# Patient Record
Sex: FEMALE | Race: WHITE | Employment: OTHER | URBAN - METROPOLITAN AREA
[De-identification: names, ages, dates, MRNs, and addresses within clinical notes are randomized per-mention and may not be internally consistent; named-entity substitution may affect disease eponyms.]

---

## 2019-01-08 ENCOUNTER — HOSPITAL ENCOUNTER (OUTPATIENT)
Dept: MAMMOGRAPHY | Age: 74
Discharge: HOME | End: 2019-01-08
Attending: INTERNAL MEDICINE
Payer: MEDICARE

## 2019-01-08 DIAGNOSIS — Z12.31 VISIT FOR SCREENING MAMMOGRAM: ICD-10-CM

## 2019-01-08 PROCEDURE — 77063 BREAST TOMOSYNTHESIS BI: CPT

## 2022-06-06 ENCOUNTER — HOSPITAL ENCOUNTER (EMERGENCY)
Dept: GENERAL RADIOLOGY | Age: 77
Discharge: HOME | End: 2022-06-09
Payer: COMMERCIAL

## 2022-06-06 ENCOUNTER — HOSPITAL ENCOUNTER (EMERGENCY)
Age: 77
Discharge: HOME | End: 2022-06-06
Attending: EMERGENCY MEDICINE
Payer: COMMERCIAL

## 2022-06-06 VITALS
OXYGEN SATURATION: 99 % | BODY MASS INDEX: 25.95 KG/M2 | HEART RATE: 100 BPM | SYSTOLIC BLOOD PRESSURE: 154 MMHG | RESPIRATION RATE: 18 BRPM | HEIGHT: 64 IN | WEIGHT: 152 LBS | TEMPERATURE: 98.4 F | DIASTOLIC BLOOD PRESSURE: 78 MMHG

## 2022-06-06 DIAGNOSIS — S32.000A COMPRESSION FRACTURE OF LUMBAR VERTEBRA, UNSPECIFIED LUMBAR VERTEBRAL LEVEL, INITIAL ENCOUNTER (HCC): Primary | ICD-10-CM

## 2022-06-06 PROCEDURE — 99283 EMERGENCY DEPT VISIT LOW MDM: CPT

## 2022-06-06 PROCEDURE — 6370000000 HC RX 637 (ALT 250 FOR IP): Performed by: EMERGENCY MEDICINE

## 2022-06-06 PROCEDURE — 72220 X-RAY EXAM SACRUM TAILBONE: CPT

## 2022-06-06 RX ORDER — HYDROCODONE BITARTRATE AND ACETAMINOPHEN 7.5; 325 MG/1; MG/1
1 TABLET ORAL EVERY 6 HOURS PRN
Qty: 15 TABLET | Refills: 0 | Status: SHIPPED | OUTPATIENT
Start: 2022-06-06 | End: 2022-06-11

## 2022-06-06 RX ORDER — ONDANSETRON 4 MG/1
4 TABLET, ORALLY DISINTEGRATING ORAL
Status: COMPLETED | OUTPATIENT
Start: 2022-06-06 | End: 2022-06-06

## 2022-06-06 RX ORDER — HYDROCODONE BITARTRATE AND ACETAMINOPHEN 7.5; 325 MG/1; MG/1
1 TABLET ORAL
Status: COMPLETED | OUTPATIENT
Start: 2022-06-06 | End: 2022-06-06

## 2022-06-06 RX ORDER — ONDANSETRON 4 MG/1
4 TABLET, ORALLY DISINTEGRATING ORAL 3 TIMES DAILY PRN
Qty: 21 TABLET | Refills: 0 | Status: SHIPPED | OUTPATIENT
Start: 2022-06-06

## 2022-06-06 RX ADMIN — ONDANSETRON 4 MG: 4 TABLET, ORALLY DISINTEGRATING ORAL at 21:36

## 2022-06-06 RX ADMIN — HYDROCODONE BITARTRATE AND ACETAMINOPHEN 1 TABLET: 7.5; 325 TABLET ORAL at 21:36

## 2022-06-06 ASSESSMENT — PAIN DESCRIPTION - ORIENTATION: ORIENTATION: LOWER

## 2022-06-06 ASSESSMENT — ENCOUNTER SYMPTOMS
VOMITING: 0
BACK PAIN: 1
SHORTNESS OF BREATH: 0
COLOR CHANGE: 0
SORE THROAT: 0
ABDOMINAL PAIN: 0
RHINORRHEA: 0
ABDOMINAL SWELLING: 0
NAUSEA: 0
CONSTIPATION: 0
DIARRHEA: 0
BOWEL INCONTINENCE: 0
COUGH: 0

## 2022-06-06 ASSESSMENT — LIFESTYLE VARIABLES
HOW OFTEN DO YOU HAVE A DRINK CONTAINING ALCOHOL: 4 OR MORE TIMES A WEEK
HOW MANY STANDARD DRINKS CONTAINING ALCOHOL DO YOU HAVE ON A TYPICAL DAY: 1 OR 2

## 2022-06-06 ASSESSMENT — PAIN DESCRIPTION - LOCATION: LOCATION: BACK

## 2022-06-06 ASSESSMENT — PAIN SCALES - GENERAL: PAINLEVEL_OUTOF10: 5

## 2022-06-06 NOTE — ED TRIAGE NOTES
Pt arrives via [facility] to triage. Pt has known lumbar fracture for 7 days. States having pain. States tramadol she was given for pain is making her nauseous. Here due to pain, which is limiting ROM. EMS reports [facility] On board. NAD. Masked.

## 2022-06-07 NOTE — ED PROVIDER NOTES
[employer] Emergency Department Provider Note                   PCP:                Mikayla Rutherford MD               Age: 65 y.o. Sex: female     No diagnosis found. DISPOSITION         New Prescriptions    No medications on file       Orders Placed This Encounter   Procedures    XR SACRUM COCCYX (MIN 2 VIEWS)    DME - [facility] DME Order for (Specify) as OP        MDM  Number of Diagnoses or Management Options  Diagnosis management comments: Patient with lumbar compression fracture. Will place patient in a TLSO brace and start new medication for pain and refer to the spine surgeon. Amount and/or Complexity of Data Reviewed  Tests in the radiology section of CPT®: ordered and reviewed  Tests in the medicine section of CPT®: ordered and reviewed    Patient Progress  Patient progress: stable Eve Alpers is a 65 y.o. female who presents to the Emergency Department with chief complaint of    Chief Complaint   Patient presents with    Back Pain      Patient states that she has a lumbar compression fracture seen on MRI. They are treating with prednisone and tramadol. She has had no relief with these medications. Also has some new bilateral feet swelling. Comes in for continued pain. The history is provided by the patient. No  was used. Back Pain  Location:  Lumbar spine  Quality:  Stabbing  Radiates to:  Does not radiate  Pain severity:  Moderate  Timing:  Constant  Progression:  Worsening  Chronicity:  New  Relieved by:  Nothing  Worsened by:  Twisting and movement  Ineffective treatments: Tramadol. Associated symptoms: pelvic pain    Associated symptoms: no abdominal pain, no abdominal swelling, no bladder incontinence, no bowel incontinence, no chest pain, no dysuria, no fever, no headaches, no numbness, no paresthesias, no perianal numbness and no weakness        All other systems reviewed and are negative.     Review of Systems Constitutional: Negative for chills and fever. HENT: Negative for rhinorrhea and sore throat. Respiratory: Negative for cough and shortness of breath. Cardiovascular: Negative for chest pain and palpitations. Gastrointestinal: Negative for abdominal pain, bowel incontinence, constipation, diarrhea, nausea and vomiting. Genitourinary: Positive for pelvic pain. Negative for bladder incontinence, dysuria and hematuria. Musculoskeletal: Positive for back pain. Negative for neck pain. Skin: Negative for color change and rash. Neurological: Negative for weakness, numbness, headaches and paresthesias. All other systems reviewed and are negative. No past medical history on file. No past surgical history on file. No family history on file. Social Connections:     Frequency of Communication with Friends and Family: Not on file    Frequency of Social Gatherings with Friends and Family: Not on file    Attends Latter-day Services: Not on file    Active Member of Clubs or Organizations: Not on file    Attends Club or Organization Meetings: Not on file    Marital Status: Not on file        No Known Allergies     Vitals signs and nursing note reviewed. Patient Vitals for the past 4 hrs:   Temp Pulse Resp BP SpO2   06/06/22 1815 98.4 °F (36.9 °C) 100 18 (!) 154/78 99 %          Physical Exam  Vitals and nursing note reviewed. Constitutional:       Appearance: Normal appearance. HENT:      Head: Normocephalic and atraumatic. Cardiovascular:      Rate and Rhythm: Normal rate and regular rhythm. Pulmonary:      Effort: Pulmonary effort is normal.      Breath sounds: Normal breath sounds. No wheezing. Abdominal:      General: Bowel sounds are normal.      Palpations: Abdomen is soft. Tenderness: There is no abdominal tenderness. Musculoskeletal:         General: Tenderness (lower back bilaterally. ) present. No swelling. Normal range of motion.       Cervical back: Normal range of motion. No tenderness. Skin:     General: Skin is warm and dry. Neurological:      Mental Status: She is alert. Procedures    Labs Reviewed - No data to display     XR SACRUM COCCYX (MIN 2 VIEWS)   Final Result   Low bone density with no definite acute bony abnormality   identified. Voice dictation software was used during the making of this note. This software is not perfect and grammatical and other typographical errors may be present. This note has not been completely proofread for errors.         Johnny Navarro III, MD  06/06/22 [id number]

## 2022-06-07 NOTE — ED NOTES
Nurse to apply TLSO brace to patient back per order.  Paper work for equipment completed     [employer], RN  06/06/22 2838

## 2022-06-07 NOTE — ED NOTES
I have reviewed discharge instructions with the patient. The patient verbalized understanding. Patient left ED via Discharge Method: wheelchair to Home with family    Opportunity for questions and clarification provided. Patient given 2 scripts. To continue your aftercare when you leave the hospital, you may receive an automated call from our care team to check in on how you are doing. This is a free service and part of our promise to provide the best care and service to meet your aftercare needs.  If you have questions, or wish to unsubscribe from this service please call [phone]. Thank you for Choosing our [facility] Emergency Department.         Ruby Valdez RN  06/06/22 [id number]

## 2022-06-20 ENCOUNTER — OFFICE VISIT (OUTPATIENT)
Dept: ORTHOPEDIC SURGERY | Age: 77
End: 2022-06-20
Payer: COMMERCIAL

## 2022-06-20 VITALS — WEIGHT: 152 LBS | HEIGHT: 64 IN | BODY MASS INDEX: 25.95 KG/M2

## 2022-06-20 DIAGNOSIS — M43.16 SPONDYLOLISTHESIS OF LUMBAR REGION: ICD-10-CM

## 2022-06-20 DIAGNOSIS — M84.48XA SACRAL INSUFFICIENCY FRACTURE, INITIAL ENCOUNTER: Primary | ICD-10-CM

## 2022-06-20 DIAGNOSIS — M51.36 DDD (DEGENERATIVE DISC DISEASE), LUMBAR: ICD-10-CM

## 2022-06-20 PROCEDURE — 99204 OFFICE O/P NEW MOD 45 MIN: CPT | Performed by: PHYSICIAN ASSISTANT

## 2022-06-20 PROCEDURE — 1123F ACP DISCUSS/DSCN MKR DOCD: CPT | Performed by: PHYSICIAN ASSISTANT

## 2022-06-20 RX ORDER — BUPROPION HYDROCHLORIDE 150 MG/1
150 TABLET ORAL DAILY
COMMUNITY
Start: 2022-02-23

## 2022-06-20 RX ORDER — ESCITALOPRAM OXALATE 20 MG/1
TABLET ORAL
COMMUNITY
Start: 2022-05-20

## 2022-06-20 RX ORDER — ROSUVASTATIN CALCIUM 10 MG/1
TABLET, COATED ORAL
COMMUNITY
Start: 2022-05-20

## 2022-06-20 RX ORDER — LORAZEPAM 0.5 MG/1
TABLET ORAL
COMMUNITY
Start: 2022-06-17

## 2022-06-20 RX ORDER — GLUCOSAMINE/CHONDR SU A SOD 750-600 MG
TABLET ORAL DAILY
COMMUNITY

## 2022-06-20 RX ORDER — HYDROCODONE BITARTRATE AND ACETAMINOPHEN 7.5; 325 MG/1; MG/1
1 TABLET ORAL EVERY 6 HOURS PRN
Qty: 28 TABLET | Refills: 0 | Status: SHIPPED | OUTPATIENT
Start: 2022-06-20 | End: 2022-06-27

## 2022-06-20 RX ORDER — FUROSEMIDE 20 MG/1
TABLET ORAL
COMMUNITY
Start: 2022-05-20

## 2022-06-20 RX ORDER — HYDROCODONE BITARTRATE AND ACETAMINOPHEN 7.5; 325 MG/1; MG/1
1 TABLET ORAL EVERY 6 HOURS PRN
COMMUNITY
Start: 2022-06-13

## 2022-06-20 RX ORDER — ASPIRIN 81 MG/1
81 TABLET ORAL DAILY
COMMUNITY

## 2022-06-20 NOTE — LETTER
Cristine Heatherude                                                     TYLER SOSA  1945  MRN [id number]                                              ROOM NUMBER______      Radiographic Studies:    Cervical MRI      Thoracic MRI         Lumbar MRI          Pelvis MRI        CONTRAST    CT Myelogram: _______________   NCS/EMG ________________ ( UE  /  LE )     MRI of ___________________          Other: ____________________      Injections:    KNEE    HIP  Depomedrol _____ mg Euflexxa _____    _______________ TFESI/SNRB  _______________ SI Joint  _______________ LAYO    _______________ Facet  _______________Piriformis/ Sciatica      Medications:    Oral Steroids _______________  NSAIDS _______________    Muscle Relaxers _______________  Neurontin/Lyrica _______________    Pain Medicine _______________  Other _______________                       Physical Therapy:    Lumbar     Thoracic      Cervical     Hip       Knee       Shoulder               Traction          Ultrasound          Dry Needling      Referral:    Pain referral:  [facility]   [facility]   Other: ______________________________    Follow-up/ Refer__________________________________________________    Authorization to hold blood thinners:___________________________________

## 2022-06-20 NOTE — PROGRESS NOTES
Name: Phani Chavira  YOB: 1945  Gender: female  MRN: [id number]    Back Pain (Low back pain)       History of Present Illness: This is a very pleasant 65y.o. year old female who is referred to us from [facility] ED for evaluation of lumbar fracture. Not fall. In May, she was standing and just heard and felt a crack in her lower back. She has had significant pain since then. She was diagnosed with fracture and ended up in [facility] ED due to continued pain on 6/6/2022. It was found that she had sacral insufficiency fractures. She was provided with an LSO brace. No lumbar vertebral body fracture was found. Patient reports severe pain 6out of 10. She has been in a wheelchair occasionally using a walker but pain is worse with getting up and down from seated position. She does have some pain that radiates into the left leg, and left groin. AMB PAIN ASSESSMENT 6/20/2022   Location of Pain Back   Location Modifiers Left   Severity of Pain 6   Duration of Pain Persistent   Frequency of Pain Constant   Date Pain First Started 6/6/2022   Aggravating Factors Walking;Standing   Limiting Behavior Yes   Relieving Factors Other (Comment)   Result of Injury No   Work-Related Injury No   Are there other pain locations you wish to document? No            ROS/Meds/PSH/PMH/FH/SH: I personally reviewed the patient's collected intake data. Below are the pertinents:    No Known Allergies    Current Outpatient Medications:     HYDROcodone-acetaminophen (NORCO) 7.5-325 MG per tablet, Take 1 tablet by mouth every 6 hours as needed. , Disp: , Rfl:     aspirin 81 MG EC tablet, Take 81 mg by mouth daily, Disp: , Rfl:     buPROPion (WELLBUTRIN XL) 150 MG extended release tablet, Take 150 mg by mouth daily, Disp: , Rfl:     vitamin D3 (CHOLECALCIFEROL) 125 MCG (5000 UT) TABS tablet, Take 5,000 Units by mouth daily, Disp: , Rfl:     diphenhydrAMINE (SOMINEX) 25 MG tablet, Take by mouth daily as needed, Disp: , Rfl:     escitalopram (LEXAPRO) 20 MG tablet, , Disp: , Rfl:     furosemide (LASIX) 20 MG tablet, , Disp: , Rfl:     LORazepam (ATIVAN) 0.5 MG tablet, , Disp: , Rfl:     Lutein-Zeaxanthin 25-5 MG CAPS, Take by mouth daily, Disp: , Rfl:     rosuvastatin (CRESTOR) 10 MG tablet, , Disp: , Rfl:     HYDROcodone-acetaminophen (NORCO) 7.5-325 MG per tablet, Take 1 tablet by mouth every 6 hours as needed for Pain for up to 7 days. Intended supply: 7 days. Take lowest dose possible to manage pain, Disp: 28 tablet, Rfl: 0    ondansetron (ZOFRAN-ODT) 4 MG disintegrating tablet, Take 1 tablet by mouth 3 times daily as needed for Nausea or Vomiting, Disp: 21 tablet, Rfl: 0  There is no problem list on file for this patient. Tobacco:  reports that she has quit smoking. She has never used smokeless tobacco.  Alcohol:   Social History     Substance and Sexual Activity   Alcohol Use None        Physical Exam:   BMI: Body mass index is 26.09 kg/m². GENERAL:  Adult in no acute distress, well developed, well nourished . Patient is appropriately conversant  MSK:  Examination of the lumbar spine reveals spinal tenderness . There is moderate tenderness to palpation along the spinous processes and paraspinal musculature. The patient ambulates with a not observed - wheelchair gait. ROM of bilateral, left hip(s) reveals moderate irritability. NEURO:  Cranial nerves grossly intact. No motor deficits.     Straight leg testing is positive  Sensory testing reveals intact sensation to light touch and in the distribution of the L3-S1 dermatomes bilaterally      Ankle jerk is negative for clonus  Babinski is negative    Reflexes   Right Left   Quadriceps (L4) 2 2   Achilles (S1) 2 2     Strength testing in the lower extremity reveals the following based on the 5 point grading scale:     HF (L2) H Ab (L5) KE (L3/4) ADF (L4) EHL (L5) A Ev (S1) APF (S1)   Right 5 5 5 5 5 5 5   Left 5 5 5 5 5 5 5     PSYCH:  Alert and oriented X 3. Appropriate affect. Intact judgment and insight. Radiographic Studies:    Xray Result (most recent):  XR SACRUM COCCYX (MIN 2 VIEWS) 06/06/2022    Narrative  THREE-VIEW SACRUM AND COCCYX:    CLINICAL HISTORY:  Sacrococcygeal pain with known lumbar insufficiency fracture. COMPARISON:  None. FINDINGS:  No displaced fracture, malalignment, or jenna bone destruction is  evident, although evaluation is limited by underlying low bone density. No  persistent radiopaque foreign body is seen. There is severe lower lumbar  spondylosis. Impression  Low bone density with no definite acute bony abnormality  identified. Procedure Note    Antonio Holguin MD - 06/13/2022   Formatting of this note might be different from the original.     EXAM: CT PELVIS WO CONTRAST, 6/13/2022 12:49 PM     INDICATION: M84.48XA Pathological fracture, other site, initial encounter for fracture I10;;Sacral fracture     COMPARISON: None. TECHNIQUE: Contiguous helical images were obtained from the iliac crest through the pubic symphysis without administration of intravenous contrast.     FINDINGS:   Osseous structures:   Diffuse osteopenia is present. There is a minimally impacted fracture involving the left pubic body with focal extension into the posterior aspect of the pubic symphysis. Additionally, there are bilateral H shaped sacral insufficiency fractures with transverse component involving the superior S2 vertebral body. There is mild calcification about the sacral insufficiency fractures which may reflect healing response. No additional fracture identified within the pelvis. Pubic symphysis and sacroiliac joints are normally aligned without evidence of joint diastases. Femoral heads are well-seated within the acetabula. There is no evidence of large effusion or lipohemarthrosis. There is grade 1 anterolisthesis of L5 on S1 secondary to unilateral left-sided L5 pars defect.  There is annular disc bulge and vacuum disc phenomenon as well as advanced facet arthropathy contributing to severe left and moderate right neural foraminal stenosis. Soft tissues: There is no evidence of avulsion fracture or high-grade tendon rupture. There are no findings of myositis, atrophy, or fatty infiltration. There is no evidence of pelvic free fluid or adenopathy. There are scattered colonic diverticula without findings of diverticulitis. No unexpected abnormality identified along the course of the neurovascular structures. IMPRESSION:     1. Healing H-shaped bilateral sacral insufficiency fractures with transverse component at the S2. No significant angulation or intra-articular extension into the SI joints. 2. Mildly impacted minimally displaced fracture of the left pubic body. 3. Grade 1 anterolisthesis of L5 on S1 secondary to unilateral L5 pars defect. Associated discogenic degenerative changes and facet arthropathy contribute to severe left L5-S1 neural foraminal stenosis. Procedure Note    Darlin Arnold MD - 06/02/2022   Formatting of this note might be different from the original.     EXAM:  MRI Lumbar spine without contrast     COMPARISON:  None. INDICATION:  M54.41 Lumbago with sciatica, right side I10;     TECHNICAL: Sagittal T1, sagittal T2, sagittal STIR, axial T1, and axial T2 sequences of the lumbar spine performed. FINDINGS:   No acute fracture. Vertebral body heights are maintained. Levoconvex curvature centered at L2. Bilateral L5 pars defects. No significant anterolisthesis. Normal marrow signal.   Conus tip terminates in a normal position without evidence of tethering. No retroperitoneal mass nor lymphadenopathy present. Lower thoracic spine: Unremarkable. L1-2: No significant spinal canal or foraminal narrowing. L2-3: Broad-based disc bulge. Mild bilateral facet arthropathy. Ligamentum flavum thickening. No spinal canal or foraminal stenosis. L3-4: Broad-based disc bulge. Moderate bilateral facet arthropathy. Ligament flavum thickening. Mild spinal canal stenosis. Mild right foraminal stenosis. L4-5: Mild narrowing of the left lateral recess. Mild bilateral facet arthropathy. No spinal canal stenosis. Mild left foraminal stenosis. L5-S1: Broad-based disc bulge. Mild bilateral facet arthropathy. Moderate left and mild right foraminal stenosis. Sacrum: Bilateral sacral insufficiency fractures with communication across the midline at S2 (coronal T1 image 9). IMPRESSION:     1.  Bilateral sacral insufficiency fractures.     2.  Moderate left foraminal stenosis at L5-S1. Mild spinal canal stenosis at L3-4. Additional multilevel lumbar spondylosis as above. I do not have the CT scan or the MRI scan images to independently review. We do have the reports to review. No vertebral body fractures. There is bilateral sacral insufficiency fracture. There is some foraminal stenosis L5S1. This is likely the source of some of her radicular symptoms. Assessment/Plan:       Diagnosis Orders   1. Sacral insufficiency fracture, initial encounter  HYDROcodone-acetaminophen (NORCO) 7.5-325 MG per tablet    IR KYPHOPLASTY LUMBAR 1 VERTEBRAL BODY   2. DDD (degenerative disc disease), lumbar  HYDROcodone-acetaminophen (NORCO) 7.5-325 MG per tablet    IR KYPHOPLASTY LUMBAR 1 VERTEBRAL BODY   3. Spondylolisthesis of lumbar region  HYDROcodone-acetaminophen (NORCO) 7.5-325 MG per tablet    IR KYPHOPLASTY LUMBAR 1 VERTEBRAL BODY         This patient's clinical history and physical exam is consistent with a sacral insufficiency fracture. We went over the treatment options as follows. I told her that the natural history of this condition is slow resolution of symptoms over a several month period. She  could opt to treat this with symptomatic care including pain management and/or could also try a brace.   With either of these measures, the fracture would be expected to heal in its current position without restoration of height or deformity. Alternatively, she  could consider a sacroplasty. I told her we do not have a physician who perform sacroplasty at [facility] [place]. I can refer her to interventional radiology for this. She is interested in this referral.  We will treat her pain with pain medication until she gets into the referral and then she will be managed by interventional radiology. Further radicular pain may be treated with lumbar steroid injections however I would recommend having the fracture healed before doing so. - Opioid: The patient was prescribed an oral pain medication. The patient understands that this is a temporary measure to bring acute or post-surgical pain under control and that it is out of the scope of this practice to continue opiates on a long-term basis. The [place] Controlled Substance database was reviewed prior to prescribing. 4 This is an acute complicated injury    Orders Placed This Encounter   Medications    HYDROcodone-acetaminophen (NORCO) 7.5-325 MG per tablet     Sig: Take 1 tablet by mouth every 6 hours as needed for Pain for up to 7 days. Intended supply: 7 days. Take lowest dose possible to manage pain     Dispense:  28 tablet     Refill:  0     Reduce doses taken as pain becomes manageable        Orders Placed This Encounter   Procedures    IR SACROPLASTY               Return for Referral to interventional radiology for sacroplasty. Cheri Harrison PA-C  06/20/22      Elements of this note were created using speech recognition software. As such, errors of speech recognition may be present.